# Patient Record
Sex: FEMALE | Race: WHITE | NOT HISPANIC OR LATINO | URBAN - METROPOLITAN AREA
[De-identification: names, ages, dates, MRNs, and addresses within clinical notes are randomized per-mention and may not be internally consistent; named-entity substitution may affect disease eponyms.]

---

## 2024-02-22 ENCOUNTER — EMERGENCY (EMERGENCY)
Facility: HOSPITAL | Age: 64
LOS: 1 days | Discharge: HOME | End: 2024-02-22
Attending: EMERGENCY MEDICINE
Payer: MEDICARE

## 2024-02-22 VITALS
RESPIRATION RATE: 16 BRPM | SYSTOLIC BLOOD PRESSURE: 147 MMHG | DIASTOLIC BLOOD PRESSURE: 87 MMHG | HEART RATE: 74 BPM | HEIGHT: 68.5 IN | OXYGEN SATURATION: 100 % | TEMPERATURE: 98 F | WEIGHT: 161.16 LBS

## 2024-02-22 PROCEDURE — 99283 EMERGENCY DEPT VISIT LOW MDM: CPT

## 2024-02-22 PROCEDURE — 99284 EMERGENCY DEPT VISIT MOD MDM: CPT | Mod: FS

## 2024-02-22 NOTE — ED PROVIDER NOTE - NSFOLLOWUPINSTRUCTIONS_ED_ALL_ED_FT
1) Follow up with your doctor as discussed  2) Return to the ED immediately for new or worsening symptoms   3) Please continue to take any home medications as prescribed  4) Your test results from your ED visit were discussed with you prior to discharge  5) You were provided with a copy of your test results  6) We have sent medications to your pharmacy. Please take as directed.

## 2024-02-22 NOTE — ED ADULT NURSE NOTE - OBJECTIVE STATEMENT
Patient presented to the ED complaining of rash on left face after eating mexican food last night. Patient states she took benadryl last night and no complaints of shortness of breath or any other respiratory distress.

## 2024-02-22 NOTE — ED PROVIDER NOTE - OBJECTIVE STATEMENT
63-year-old female no pertinent past medical history presenting to emergency department for evaluation of facial rash.  Patient states she noticed redness and itching around her mouth after wearing a mask during her COVID infection.  Rash went away however returned last week.  Has been taking Benadryl and putting cortisone on with minimal improvement.  Has not been wearing mask recently.  Denies new medications or facial products.  Denies oral lesion, fever, chest pain, shortness of breath, abdominal pain, nausea, vomiting, diarrhea, urinary symptoms, visual change, headache, other complaint.

## 2024-02-22 NOTE — ED PROVIDER NOTE - NSFOLLOWUPCLINICS_GEN_ALL_ED_FT
[facility]  Dermatology  [address] [place] [address]  Phone: [phone]  Fax: [phone]

## 2024-02-22 NOTE — ED ADULT NURSE NOTE - NSFALLLASTSIX_ED_ALL_ED
[Plain X-Rays] : plain X-Rays [FreeTextEntry1] : X-rays C-spine (2020): Advanced degenerative disc disease at C6-C7. Focal calcification of the anterior longitudinal ligament at C5-C6. Schmorl's nodes. No.

## 2024-02-22 NOTE — ED PROVIDER NOTE - CLINICAL SUMMARY MEDICAL DECISION MAKING FREE TEXT BOX
63-year-old female no pertinent past medical history presenting to emergency department for evaluation of facial rash.  Patient states she noticed redness and itching around her mouth after wearing a mask during her COVID infection.  Rash went away however returned last week.  Has been taking Benadryl and putting cortisone on with minimal improvement.  Has not been wearing mask recently.  Denies new medications or facial products.  Denies oral lesion, fever, chest pain, shortness of breath, abdominal pain, nausea, vomiting, diarrhea, urinary symptoms, visual change, headache, other complaint. PE as above, No e/o erythema multiforme, SJS/TEN, Lyme,, necrotizing fasciitis, no angioedema, meningococcemia, lucia mountain spotted fever. Do not suspect rosacea, erysipelas, cellulitis, suspect contact dermatitis. Plan for steroid rx, removal of topical irritants, derm follow up, strict return precautions. 63-year-old female no pertinent past medical history presenting to emergency department for evaluation of facial rash.  Patient states she noticed redness and itching around her mouth after wearing a mask during her COVID infection.  Rash went away however returned last week.  Has been taking Benadryl and putting cortisone on with minimal improvement.  Has not been wearing mask recently.  Denies new medications or facial products.  Denies oral lesion, fever, chest pain, shortness of breath, abdominal pain, nausea, vomiting, diarrhea, urinary symptoms, visual change, headache, other complaint. PE as above, No e/o erythema multiforme, SJS/TEN, Lyme,, necrotizing fasciitis, no angioedema, meningococcemia, lucia mountain spotted fever. Do not suspect HSV, rosacea, erysipelas, cellulitis, suspect contact dermatitis. Plan for steroid rx, removal of topical irritants, derm follow up, strict return precautions.

## 2024-02-22 NOTE — ED PROVIDER NOTE - PHYSICAL EXAMINATION
Gen: NAD, AOx3, able to make needs known, non-toxic  Head: NCAT  HEENT: EOMI, oral mucosa moist, normal conjunctiva, no oral lesions  Lung: CTAB, no respiratory distress, no wheezes/rhonchi/rales B/L, speaking in full sentences  CV: RRR, no murmurs  Abd: non distended, soft, nontender, no guarding, no CVA tenderness  MSK: no visible deformities  Neuro: Appears non focal  Skin: circumoral maculopapular rash   Psych: normal affect

## 2024-02-22 NOTE — ED PROVIDER NOTE - PATIENT PORTAL LINK FT
You can access the FollowMyHealth Patient Portal offered by [facility] by registering at the following website: [email or web address]. By joining [facility]’s FollowMyHealth portal, you will also be able to view your health information using other applications (apps) compatible with our system.

## 2024-02-22 NOTE — ED ADULT TRIAGE NOTE - CHIEF COMPLAINT QUOTE
patient reports eating Mexican food x last night complaining of rash on L face took benadryl last night states she feels sleepy. no sob
